# Patient Record
Sex: FEMALE | Race: BLACK OR AFRICAN AMERICAN | NOT HISPANIC OR LATINO | URBAN - METROPOLITAN AREA
[De-identification: names, ages, dates, MRNs, and addresses within clinical notes are randomized per-mention and may not be internally consistent; named-entity substitution may affect disease eponyms.]

---

## 2024-11-20 ENCOUNTER — LAB OUTSIDE AN ENCOUNTER (OUTPATIENT)
Dept: URBAN - METROPOLITAN AREA CLINIC 118 | Facility: CLINIC | Age: 29
End: 2024-11-20

## 2024-11-20 ENCOUNTER — OFFICE VISIT (OUTPATIENT)
Dept: URBAN - METROPOLITAN AREA CLINIC 118 | Facility: CLINIC | Age: 29
End: 2024-11-20
Payer: MEDICAID

## 2024-11-20 VITALS
BODY MASS INDEX: 37.56 KG/M2 | TEMPERATURE: 97.2 F | HEIGHT: 64 IN | SYSTOLIC BLOOD PRESSURE: 126 MMHG | WEIGHT: 220 LBS | HEART RATE: 82 BPM | DIASTOLIC BLOOD PRESSURE: 81 MMHG

## 2024-11-20 DIAGNOSIS — R10.13 POSTPRANDIAL EPIGASTRIC PAIN: ICD-10-CM

## 2024-11-20 DIAGNOSIS — R11.2 NAUSEA AND VOMITING, UNSPECIFIED VOMITING TYPE: ICD-10-CM

## 2024-11-20 DIAGNOSIS — K30 INDIGESTION: ICD-10-CM

## 2024-11-20 DIAGNOSIS — R53.83 OTHER FATIGUE: ICD-10-CM

## 2024-11-20 PROBLEM — 162031009: Status: ACTIVE | Noted: 2024-11-20

## 2024-11-20 PROCEDURE — 99214 OFFICE O/P EST MOD 30 MIN: CPT

## 2024-11-20 RX ORDER — PANTOPRAZOLE SODIUM 40 MG/1
1 TABLET TABLET, DELAYED RELEASE ORAL ONCE A DAY
Qty: 90 TABLET | Refills: 3 | OUTPATIENT
Start: 2024-11-20

## 2024-11-20 NOTE — HPI-TODAY'S VISIT:
Patient is a 30 y/o F who presents for dyspepsia s/p ccy (9/2024).  Patient reports she is still having postprandial epigastric burning pain and early satiety. Described as "stabbing and cramping like contractions." Pain has been ongoing since prior to ccy, but worse in the past week. Associated nausea. Mild heartburn. Went to [facility] one week ago for intractable N/V. Was treated w/ abx (cephalexin and amoxicillin), but patient is unsure why. No records available. Denies hematemesis. She reports frequent use of NSAIDs. Also reports increased fatigue in the last 2-3 days. She is unable to get out of bed, just sleeps all day. Patient reports tactile fever last night. Resolved by this AM.  Patient reports 10# wt loss since surgery. Records in Epic show 2# wt gain. Patient

## 2024-11-21 LAB
ABSOLUTE BASOPHILS: 28
ABSOLUTE EOSINOPHILS: 707
ABSOLUTE LYMPHOCYTES: 2167
ABSOLUTE MONOCYTES: 335
ABSOLUTE NEUTROPHILS: 6064
ALBUMIN/GLOBULIN RATIO: 1.5
ALBUMIN: 4.1
ALKALINE PHOSPHATASE: 91
ALT (SGPT): 17
AST (SGOT): 14
BASOPHILS: 0.3
BILIRUBIN, DIRECT: 0.1
BILIRUBIN, INDIRECT: 0.4
BILIRUBIN, TOTAL: 0.5
BUN/CREATININE RATIO: (no result)
CALCIUM: 8.7
CARBON DIOXIDE: 28
CHLORIDE: 104
CREATININE: 0.67
EGFR: 122
EOSINOPHILS: 7.6
FERRITIN, SERUM: 31
GLOBULIN: 2.7
GLUCOSE: 77
HEMATOCRIT: 38.8
HEMOGLOBIN: 12.3
IRON BIND.CAP.(TIBC): 342
IRON SATURATION: 23
IRON: 79
LYMPHOCYTES: 23.3
MCH: 23.9
MCHC: 31.7
MCV: 75.5
MONOCYTES: 3.6
MPV: 10.7
NEUTROPHILS: 65.2
PLATELET COUNT: 372
POTASSIUM: 4.7
PROTEIN, TOTAL: 6.8
RDW: 15
RED BLOOD CELL COUNT: 5.14
SODIUM: 140
UREA NITROGEN (BUN): 10
WHITE BLOOD CELL COUNT: 9.3

## 2024-12-04 ENCOUNTER — OFFICE VISIT (OUTPATIENT)
Dept: URBAN - METROPOLITAN AREA CLINIC 118 | Facility: CLINIC | Age: 29
End: 2024-12-04

## 2024-12-23 ENCOUNTER — OFFICE VISIT (OUTPATIENT)
Dept: URBAN - METROPOLITAN AREA CLINIC 117 | Facility: CLINIC | Age: 29
End: 2024-12-23